# Patient Record
Sex: FEMALE | Race: WHITE | Employment: PART TIME | URBAN - METROPOLITAN AREA
[De-identification: names, ages, dates, MRNs, and addresses within clinical notes are randomized per-mention and may not be internally consistent; named-entity substitution may affect disease eponyms.]

---

## 2017-01-13 ENCOUNTER — HOSPITAL ENCOUNTER (OUTPATIENT)
Dept: PHYSICAL THERAPY | Age: 52
Discharge: HOME | End: 2017-01-13
Payer: COMMERCIAL

## 2017-01-13 PROCEDURE — 97530 THERAPEUTIC ACTIVITIES: CPT

## 2017-01-13 PROCEDURE — 97110 THERAPEUTIC EXERCISES: CPT

## 2017-01-13 PROCEDURE — 97140 MANUAL THERAPY 1/> REGIONS: CPT

## 2017-01-13 NOTE — PROGRESS NOTES
PHYSICAL THERAPY - DAILY TREATMENT NOTE    Patient Name: Lasha Vazquez        Date: 2017  : 1965    Patient  Verified: YES  Visit #:   16   of      Insurance: Payor: BLUE CROSS / Plan: [address] [place] [address] / Product Type: PPO /      In time: 930 Out time: 1030   Total Treatment Time: 60     Medicare Time Tracking (below)   Total Timed Codes (min):    1:1 Treatment Time:        TREATMENT AREA/ DIAGNOSIS = Low back pain [M54.5]  Fusion of spine, unspecified spinal region [M43.20]    SUBJECTIVE  Pain Level (on 0 to 10 scale):  2  / 10 left hip   Medication Changes/New allergies or changes in medical history, any new surgeries or procedures?     NO    If yes, update Summary List   Subjective Functional Status/Changes:  [x]  No changes reported     Functional improvement: MD said that I can start trying the needling   Functional limitation:       OBJECTIVE  Modalities Rationale:   x decrease edema/ inflammation  x decrease pain  x  increase tissue extensibility    x increase muscle performance    decrease neural compromise  to improve patient's ability to   x perform ADLs     ambulate  x perform work    relaxation/ sleep      min [] Estim, type/location:                                      []  att     []  unatt     []  w/US     []  w/ice    []  w/heat    min []  Mechanical Traction: type/lbs                   []  pro   []  sup   []  int   []  cont    []  before manual    []  after manual    min []  Ultrasound, settings/location:      min []  Iontophoresis w/ dexamethasone, location:                                               []  take home patch       []  in clinic    min   Ice        Heat    location/position:     min []  Vasopneumatic Device, press/temp:     min []  Other:    [] Skin assessment post-treatment (if applicable):    []  intact    []  redness- no adverse reaction     []redness  adverse reaction:        30 min Therapeutic Exercise:  [x]  See flow sheet   Rationale:   x increase ROM   x increase strength   x increase endurance     x increase motor control    Other  to improve patients ability to x perform ADLs     ambulate   x perform work    relaxation/ sleep     15 min Manual Therapy: Technique:        x STM    ASTM  x TPR    PROM    x Stretching     Jt manipulation  x Gr I.   x II.    x  III. IV.     V.  Treatment Area: l/s, QL, piriformis  Soft/Deep Tissue Massage, Manual Stretching, and Trigger Point Release to Cervical Spine Musculature, Upper Traps, and Levator Scapulae. Suboccipital Release. Other:   Rationale:   x decrease pain   x decrease TP   x increase ROM/ mobility   x increase tissue extensibility       decrease edema     reduce disc    postural correction   other     to improve patient's ability to  x perform ADLs      ambulate   x perform work     relaxation/ sleep      15  min Therapeutic Activity:  [x] see flow sheet   Rationale:  x  increase ROM    x  increase strength    x  increase balance/ proprioception  x    increase motor control    other   to improve patients ability to    x perform ADLs     ambulate   x  perform work                relaxation/ sleep      min Neuromuscular Re-ed:  [x] see flow sheet   Rationale:   x increase ROM    x increase strength    x increase balance/ proprioception  x increase motor control      improve safety   other    to improve patients ability to   x perform ADLs      ambulate   x perform work    relaxation/ sleep                min Gait Training: To improve patients ability to:    [] perform ADLs   [] ambulate       min Patient Education:  Yes    [x] Reviewed HEP   []  Progressed/Changed HEP based on:         Other Objective/Functional Measures:    Patient education for postural awareness, body mechanics, and lifting techniques      Post Treatment Pain Level (on 0 to 10) scale:   1  / 10     ASSESSMENT  []  See Progress Note/Recertification      Patient will continue to benefit from skilled PT services to modify and progress therapeutic interventions, address functional mobility deficits, address ROM deficits and address strength deficits to attain remaining goals.    Progress toward goals / Updated goals:    [x] decr pain   [x]inc stability   []inc balance [x] inc ROM[x] inc strength  [] centralizing symptoms                    [x] progressing  Function  [x] progressing towards LTGs            [x]  progressing HEP       PLAN  [x]  Upgrade activities as tolerated YES Continue plan of care   []  Discharge due to :    []  Other:       Therapist: Bessy Mleo PTA    Date: 1/13/2017 Time: 3:40 PM

## 2017-01-16 NOTE — PROGRESS NOTES
[address] PHYSICAL THERAPY AT [address], [address] [place] [address], [address], [address] [place] [address]  Phone: [phone]  Fax: [phone]  PROGRESS NOTE  Patient Name: Dominik Meyer : 1965   Treatment/Medical Diagnosis: Low back pain [M54.5]  Fusion of spine, unspecified spinal region [M43.20]   Referral Source: Damaris Oh MD     Date of Initial Visit: 10/14/16 Attended Visits: 17 Missed Visits: 3     SUMMARY OF TREATMENT  Progressive therapeutic exercise to increase strength, stability, endurance, and function. Manual Augmented soft tissue massage and trigger point release   Patient education for postural awareness, body mechanics, and lifting techniques     CURRENT STATUS  Patient is progressing slowly through core stability and strengthening. Will start myofascial trigger point dry needling (TDN) this week. Patient averaging 2/10 on NPS since she has started cutting down on the volume of exercises she was doing at the gym. Goal/Measure of Progress Goal Met? 1. Patient will decrease pain to 2/10 at worst to improve ability to perform ADLs    Status at last Eval: 3-4/10 Current Status: 5/10 no   2. Patient will increase BLE strength to 5/5 throughout to improve ability to return to a fitness routine    Status at last Eval: 4+/5, left hip abd/ext 4-/5 Current Status: Not assessed ongoing   3. Patient will improve FOTO score by 8 points    Status at last Eval: 59 Current Status: Not assessed ongoing      New Goals to be achieved in __4__ weeks:  1. Continue with above unmet goals   2. Pt will be independent and pain free with walk/jog program   3. Pt will be able to return to gym work outs without pain   4.        RECOMMENDATIONS  Continue per current POC to assist with increasing function while returning to previous ADLs. If you have any questions/comments please contact us directly at [phone].    Thank you for allowing us to assist in the care of your patient. Therapist Signature: Chetan Whitney PTA Date: 1/16/2017     Time: 1:03 PM   NOTE TO PHYSICIAN:  PLEASE COMPLETE THE ORDERS BELOW AND FAX TO   [facility] at [address]: [phone]. If you are unable to process this request in 24 hours please contact our office: [phone].    ___ I have read the above report and request that my patient continue as recommended.   ___ I have read the above report and request that my patient continue therapy with the following changes/special instructions:_________________________________________________________   ___ I have read the above report and request that my patient be discharged from therapy.      Physician Signature:        Date:       Time:

## 2017-01-20 ENCOUNTER — HOSPITAL ENCOUNTER (OUTPATIENT)
Dept: PHYSICAL THERAPY | Age: 52
Discharge: HOME | End: 2017-01-20
Payer: COMMERCIAL

## 2017-01-20 PROCEDURE — 97140 MANUAL THERAPY 1/> REGIONS: CPT

## 2017-01-20 PROCEDURE — 97110 THERAPEUTIC EXERCISES: CPT

## 2017-01-20 NOTE — PROGRESS NOTES
PHYSICAL THERAPY - DAILY TREATMENT NOTE  -    Patient Name: Caro Chi        Date: 2017  : 1965   YES Patient  Verified  Visit #:   (90) 7   of   8  Insurance: Payor: Kade May / Plan: [address] [place] [address] / Product Type: PPO /      In time: 9:30 Out time: 10:25   Total Treatment Time: 55     Medicare Time Tracking (below)   Total Timed Codes (min):   1:1 Treatment Time:       TREATMENT AREA = Low back pain [M54.5]  Fusion of spine, unspecified spinal region [M43.20]    SUBJECTIVE    Pain Level (on 0 to 10 scale):  2  / 10   Medication Changes/New allergies or changes in medical history, any new surgeries or procedures? NO    If yes, update Summary List   Subjective Functional Status/Changes:  []  No changes reported     \"still the same soreness, mostly in the L hip. \"       OBJECTIVE    25 min Therapeutic Exercise:  [x]  See flow sheet   Rationale:      increase ROM and increase strength to improve the patients ability to walk, work out       30 min Manual Therapy:  TDN L hip, infraspinatus   Rationale:      decrease pain, increase ROM, increase tissue extensibility and decrease trigger points to improve patient's ability to walk, work out       min Patient Education:  YES  Reviewed HEP   []  Progressed/Changed HEP based on: Other Objective/Functional Measures: Other Objective/Functional Measures:    Dry Needling Procedure Note    Dry Needle Session Number:  1    Procedure: An intramuscular manual therapy (dry needling) and a neuro-muscular re-education treatment was done to deactivate myofascial trigger points, with a 30/50 and 30/60 gauge solid filament needle, under aseptic technique.     Indication(s): [] Muscle spasms [] Myalgia/Myositis  [] Muscle cramps      [] Muscle imbalances [] TMD (TMJ) [x] Myofascial pain & dysfunction     [] Other: _muscle stiffness_    TIMEOUT PERFORMED:  9:30 (enter time the timeout was completed)   Clyde Ro (agustin who was present)    Informed Consent Obtained: [x] Verbal  [x] Written (obtained at first needling session)  The following items were reviewed with the patient:   Purpose of dry needling, side effects, possible complications, and the informed consent    The need to report the use of blood thinners and/or immunosuppressant medications    How to respond to possible adverse effects of the treatment   Self treatment of post needling soreness: heat (moist heat, heat wraps) and stretching   Opportunity was given to ask any questions, all questions were answered    Treatment:  The following muscles were treated today:    Right: n/a   Left: glute max, med, infraspinatus, piriformis     Patients response to todays treatment:   [x]  LTRs  [x]  Muscle Relaxation  [x]  Pain Relief  []  Decreased Tinnitus  []  Decreased HAs [x]  Post needling soreness [x]  Increased ROM   []  Other:             Post Treatment Pain Level (on 0 to 10) scale:   2  / 10     ASSESSMENT    Assessment/Changes in Function:     Improved L hip ROM with piriformis stretch following TDN. []  See Progress Note/Recertification   Patient will continue to benefit from skilled PT services to modify and progress therapeutic interventions, address functional mobility deficits, address ROM deficits, address strength deficits, analyze and address soft tissue restrictions, analyze and cue movement patterns, analyze and modify body mechanics/ergonomics, assess and modify postural abnormalities and instruct in home and community integration to attain remaining goals. to attain remaining goals.    Progress toward goals / Updated goals:    Progressing towards LTGs     PLAN    []  Upgrade activities as tolerated YES Continue plan of care   []  Discharge due to :    []  Other:      Therapist: Rodriguez Jenkins PT    Date: 1/20/2017 Time: 11:03 AM   Future Appointments  Date Time Provider Ramsey Trammell   1/25/2017 9:30 AM Hollie Leger PTA [facility]   2/1/2017 11:30 AM Elvin Ruiz PT [facility]

## 2017-01-25 ENCOUNTER — HOSPITAL ENCOUNTER (OUTPATIENT)
Dept: PHYSICAL THERAPY | Age: 52
Discharge: HOME | End: 2017-01-25
Payer: COMMERCIAL

## 2017-01-25 PROCEDURE — 97140 MANUAL THERAPY 1/> REGIONS: CPT

## 2017-01-25 PROCEDURE — 97110 THERAPEUTIC EXERCISES: CPT

## 2017-01-25 NOTE — PROGRESS NOTES
PHYSICAL THERAPY - DAILY TREATMENT NOTE    Patient Name: Jaquan Bradley        Date: 2017  : 1965    Patient  Verified: YES  Visit #:   23   of      Insurance: Payor: BLUE CROSS / Plan: [address] [place] [address] / Product Type: PPO /      In time: 930 Out time: 1020   Total Treatment Time: 50     Medicare Time Tracking (below)   Total Timed Codes (min):    1:1 Treatment Time:        TREATMENT AREA/ DIAGNOSIS = Low back pain [M54.5]  Fusion of spine, unspecified spinal region [M43.20]     SUBJECTIVE  Pain Level (on 0 to 10 scale):  2  / 10 left hip   Medication Changes/New allergies or changes in medical history, any new surgeries or procedures?     NO    If yes, update Summary List   Subjective Functional Status/Changes:  [x]  No changes reported     Functional improvement: I had better hip mobility    Functional limitation:  Still sore through my hip and arm      OBJECTIVE  Modalities Rationale:   x decrease edema/ inflammation  x decrease pain  x  increase tissue extensibility    x increase muscle performance    decrease neural compromise  to improve patient's ability to   x perform ADLs     ambulate  x perform work    relaxation/ sleep      min [] Estim, type/location:                                      []  att     []  unatt     []  w/US     []  w/ice    []  w/heat    min []  Mechanical Traction: type/lbs                   []  pro   []  sup   []  int   []  cont    []  before manual    []  after manual    min []  Ultrasound, settings/location:      min []  Iontophoresis w/ dexamethasone, location:                                               []  take home patch       []  in clinic    min   Ice        Heat    location/position:     min []  Vasopneumatic Device, press/temp:     min []  Other:    [] Skin assessment post-treatment (if applicable):    []  intact    []  redness- no adverse reaction     []redness  adverse reaction:        40 min Therapeutic Exercise:  [x]  See flow sheet Rationale:   x increase ROM   x increase strength   x increase endurance     x increase motor control    Other  to improve patients ability to x perform ADLs     ambulate   x perform work    relaxation/ sleep     10 min Manual Therapy: Technique:        x STM    ASTM  x TPR    PROM    x Stretching     Jt manipulation  x Gr I.   x II.    x  III. IV.     V.  Treatment Area: l/s, QL, piriformis  Soft/Deep Tissue Massage, Manual Stretching, and Trigger Point Release to Cervical Spine Musculature, Upper Traps, and Levator Scapulae. Suboccipital Release. Other:   Rationale:   x decrease pain   x decrease TP   x increase ROM/ mobility   x increase tissue extensibility       decrease edema     reduce disc    postural correction   other     to improve patient's ability to  x perform ADLs      ambulate   x perform work     relaxation/ sleep       min Therapeutic Activity:  [x] see flow sheet   Rationale:  x  increase ROM    x  increase strength    x  increase balance/ proprioception  x    increase motor control    other   to improve patients ability to    x perform ADLs     ambulate   x  perform work                relaxation/ sleep      min Neuromuscular Re-ed:  [x] see flow sheet   Rationale:   x increase ROM    x increase strength    x increase balance/ proprioception  x increase motor control      improve safety   other    to improve patients ability to   x perform ADLs      ambulate   x perform work    relaxation/ sleep                min Gait Training: To improve patients ability to:    [] perform ADLs   [] ambulate       min Patient Education:  Yes    [x] Reviewed HEP   []  Progressed/Changed HEP based on:         Other Objective/Functional Measures:    Patient education for postural awareness, body mechanics, and lifting techniques   Added TB press out, half kneel chops and lifts     Post Treatment Pain Level (on 0 to 10) scale:   1  / 10     ASSESSMENT  []  See Progress Note/Recertification Patient will continue to benefit from skilled PT services to modify and progress therapeutic interventions, address functional mobility deficits, address ROM deficits and address strength deficits to attain remaining goals.    Progress toward goals / Updated goals:    [x] decr pain   [x]inc stability   []inc balance [x] inc ROM[x] inc strength  [] centralizing symptoms                    [x] progressing  Function  [x] progressing towards LTGs            [x]  progressing HEP       PLAN  [x]  Upgrade activities as tolerated YES Continue plan of care   []  Discharge due to :    []  Other:       Therapist: Lizabeth Moreno PTA    Date: 1/25/2017 Time: 3:40 PM

## 2017-02-01 ENCOUNTER — HOSPITAL ENCOUNTER (OUTPATIENT)
Dept: PHYSICAL THERAPY | Age: 52
Discharge: HOME | End: 2017-02-01
Payer: COMMERCIAL

## 2017-02-01 PROCEDURE — 97110 THERAPEUTIC EXERCISES: CPT

## 2017-02-01 PROCEDURE — 97140 MANUAL THERAPY 1/> REGIONS: CPT

## 2017-02-01 NOTE — PROGRESS NOTES
PHYSICAL THERAPY - DAILY TREATMENT NOTE      Patient Name: Boubacar Mathur        Date: 2017  : 1965   YES Patient  Verified  Visit #:   (20) 1   of    8 Insurance: Payor: BLUE CROSS / Plan: [address] [place] [address] / Product Type: PPO /      In time: 11:30 Out time: 12:40   Total Treatment Time: 70     Medicare Time Tracking (below)   Total Timed Codes (min):   1:1 Treatment Time:       TREATMENT AREA = Low back pain [M54.5]  Fusion of spine, unspecified spinal region [M43.20]    SUBJECTIVE    Pain Level (on 0 to 10 scale):  2  / 10   Medication Changes/New allergies or changes in medical history, any new surgeries or procedures? NO    If yes, update Summary List   Subjective Functional Status/Changes:  []  No changes reported     \"I'm doing much better. Running 3-5 times/week but still feel tight and just a twinge in the upper back/shoulder. \"       OBJECTIVE    30 min Therapeutic Exercise:  [x]  See flow sheet   Rationale:      increase ROM, increase strength and improve coordination to improve the patients ability to run, work out       40 min Manual Therapy:  TDN B lumbar paraspinals, L glutes, piriformis, infraspinatus, upper trap   Rationale:      decrease pain, increase ROM, increase tissue extensibility and decrease trigger points to improve patient's ability to run, work out       min Patient Education:  YES  Reviewed HEP   []  Progressed/Changed HEP based on: Other Objective/Functional Measures: Other Objective/Functional Measures:    Dry Needling Procedure Note    Dry Needle Session Number:  2    Procedure: An intramuscular manual therapy (dry needling) and a neuro-muscular re-education treatment was done to deactivate myofascial trigger points, with a 30/50 gauge solid filament needle, under aseptic technique.     Indication(s): [] Muscle spasms [] Myalgia/Myositis  [] Muscle cramps      [] Muscle imbalances [] TMD (TMJ) [x] Myofascial pain & dysfunction     [] Other: _muscle stiffness_    TIMEOUT PERFORMED:  11:30 (enter time the timeout was completed)   Adina Recinos (enter who was present)    Informed Consent Obtained: [x] Verbal  [x] Written (obtained at first needling session)  The following items were reviewed with the patient:   Purpose of dry needling, side effects, possible complications, and the informed consent    The need to report the use of blood thinners and/or immunosuppressant medications    How to respond to possible adverse effects of the treatment   Self treatment of post needling soreness: heat (moist heat, heat wraps) and stretching   Opportunity was given to ask any questions, all questions were answered    Treatment:  The following muscles were treated today:    Right: Lumbar paraspinals   Left: Lumbar paraspinals, piriformis, glutes, infraspinatus, upper trap      Patients response to todays treatment:   [x]  LTRs  [x]  Muscle Relaxation  [x]  Pain Relief  []  Decreased Tinnitus  []  Decreased HAs [x]  Post needling soreness []  Increased ROM   []  Other:             Post Treatment Pain Level (on 0 to 10) scale:   0  / 10     ASSESSMENT    Assessment/Changes in Function:     Improved ROM and decreased pain following TDN. See PN     []  See Progress Note/Recertification   Patient will continue to benefit from skilled PT services to modify and progress therapeutic interventions, address functional mobility deficits, address ROM deficits, address strength deficits, analyze and address soft tissue restrictions, analyze and cue movement patterns, analyze and modify body mechanics/ergonomics, assess and modify postural abnormalities and instruct in home and community integration to attain remaining goals. to attain remaining goals.    Progress toward goals / Updated goals:    See PN     PLAN    []  Upgrade activities as tolerated YES Continue plan of care   []  Discharge due to :    []  Other:      Therapist: Ken Garcia, PT    Date: 2/1/2017 Time: 12:16 PM   No future appointments.

## 2017-02-01 NOTE — PROGRESS NOTES
[facility] PHYSICAL THERAPY AT [address], [address] [place] [address], [address], [address] [place] [address]  Phone: [phone]  Fax: [phone]  PROGRESS NOTE  Patient Name: Jd Anderson : 1965   Treatment/Medical Diagnosis: Low back pain [M54.5]  Fusion of spine, unspecified spinal region [M43.20]   Referral Source: Rod West MD     Date of Initial Visit: 10/14/16 Attended Visits: 20 Missed Visits: 1     SUMMARY OF TREATMENT  Physical therapy has consisted of therapeutic exercise and neuromuscular re-education for core and hip strengthening, manual therapy including DTM, TrPR, TDN, and STM, pt education for activity modification, posture correction, breathing techniques, and HEP, and ice for pain relief. CURRENT STATUS  Pt has made good progress in the past few weeks with the addition of TDN as part of treatment. She is able to walk/run for about 2 miles and is progressing well with gym workouts. She is also able to sleep much better. Pain has been minimal, but she continues to have stiffness in low back and L hip. Pt would benefit from continued PT to further reduce sx, restore full and pain free ROM, and improve strength to facilitate working out. Previous Goals:  1. Patient will decrease pain to 2/10 at worst to improve ability to perform ADLs   2. Patient will increase BLE strength to 5/5 throughout to improve ability to return to a fitness routine   3. Patient will improve FOTO score by 8 points  4. Pt will be independent and pain free with walk/jog program  5.  Pt will be able to return to gym work outs without pain    Prior Level/Current Level:  1) Prior Level: 5/10   Current Level: 4/10   Goal Met? progressing  2) Prior Level: 3-4/5 throughout   Current Level: Flex: 5/5 B, Ext: R: 4/5 L: 4+/5, ABD: 4+/5 B, ADD: 4/5 B, ER: R: 5/5 L: 4+/5, IR: R: 5/5 L: 4/5   Goal Met? progressing  3) Prior Level: 50   Current Level: 71   Goal Met? yes  4) Prior Level: unable   Current Level: independent in walk/jog program   Goal Met? yes  5) Prior Level: unable   Current Level: working out at the gym but lower intensity   Goal Met? progressing    New Goals to be achieved in __4__  weeks:  1. Patient will decrease pain to 2/10 at worst to improve ability to perform ADLs   2. Patient will increase BLE strength to 5/5 throughout to improve ability to return to a fitness routine  3. Pt will be able to return to gym work outs without pain   4. Pt will be independent in long term HEP    RECOMMENDATIONS  Continue PT 2x/week for 4 weeks  If you have any questions/comments please contact us directly at [phone]. Thank you for allowing us to assist in the care of your patient. Therapist Signature: Jennifer Porras PT, DPT Date: 2/1/2017     Time: 12:45 PM   NOTE TO PHYSICIAN:  PLEASE COMPLETE THE ORDERS BELOW AND FAX TO   [facility] at [address]: [phone]. If you are unable to process this request in 24 hours please contact our office: [phone].    ___ I have read the above report and request that my patient continue as recommended.   ___ I have read the above report and request that my patient continue therapy with the following changes/special instructions:_________________________________________________________   ___ I have read the above report and request that my patient be discharged from therapy.      Physician Signature:        Date:       Time:

## 2017-02-08 ENCOUNTER — APPOINTMENT (OUTPATIENT)
Dept: PHYSICAL THERAPY | Age: 52
End: 2017-02-08
Payer: COMMERCIAL

## 2017-02-10 ENCOUNTER — HOSPITAL ENCOUNTER (OUTPATIENT)
Dept: PHYSICAL THERAPY | Age: 52
Discharge: HOME | End: 2017-02-10
Payer: COMMERCIAL

## 2017-02-10 PROCEDURE — 97140 MANUAL THERAPY 1/> REGIONS: CPT

## 2017-02-10 PROCEDURE — 97110 THERAPEUTIC EXERCISES: CPT

## 2017-02-10 NOTE — PROGRESS NOTES
PHYSICAL THERAPY - DAILY TREATMENT NOTE    Patient Name: Allyn Hill        Date: 2/10/2017  : 1965    Patient  Verified: YES  Visit #:   21   of      Insurance: Payor: BLUE CROSS / Plan: [address] [place] [address] / Product Type: PPO /      In time: 10 Out time: 11   Total Treatment Time: 60     Medicare Time Tracking (below)   Total Timed Codes (min):    1:1 Treatment Time:        TREATMENT AREA/ DIAGNOSIS = Low back pain [M54.5]  Fusion of spine, unspecified spinal region [M43.20]     SUBJECTIVE  Pain Level (on 0 to 10 scale):  2  / 10 left hip   Medication Changes/New allergies or changes in medical history, any new surgeries or procedures?     NO    If yes, update Summary List   Subjective Functional Status/Changes:  [x]  No changes reported     Functional improvement: I had better hip mobility    Functional limitation:  Still sore through my hip and arm      OBJECTIVE  Modalities Rationale:   x decrease edema/ inflammation  x decrease pain  x  increase tissue extensibility    x increase muscle performance    decrease neural compromise  to improve patient's ability to   x perform ADLs     ambulate  x perform work    relaxation/ sleep      min [] Estim, type/location:                                      []  att     []  unatt     []  w/US     []  w/ice    []  w/heat    min []  Mechanical Traction: type/lbs                   []  pro   []  sup   []  int   []  cont    []  before manual    []  after manual    min []  Ultrasound, settings/location:      min []  Iontophoresis w/ dexamethasone, location:                                               []  take home patch       []  in clinic    min   Ice        Heat    location/position:     min []  Vasopneumatic Device, press/temp:     min []  Other:    [] Skin assessment post-treatment (if applicable):    []  intact    []  redness- no adverse reaction     []redness  adverse reaction:        40 min Therapeutic Exercise:  [x]  See flow sheet   Rationale: x increase ROM   x increase strength   x increase endurance     x increase motor control    Other  to improve patients ability to x perform ADLs     ambulate   x perform work    relaxation/ sleep     20 min Manual Therapy: Technique:        x STM    ASTM  x TPR    PROM    x Stretching     Jt manipulation  x Gr I.   x II.    x  III. IV.     V.  Treatment Area: l/s, QL, piriformis  Soft/Deep Tissue Massage, Manual Stretching, and Trigger Point Release to Cervical Spine Musculature, Upper Traps, and Levator Scapulae. Suboccipital Release. Other:   Rationale:   x decrease pain   x decrease TP   x increase ROM/ mobility   x increase tissue extensibility       decrease edema     reduce disc    postural correction   other     to improve patient's ability to  x perform ADLs      ambulate   x perform work     relaxation/ sleep       min Therapeutic Activity:  [x] see flow sheet   Rationale:  x  increase ROM    x  increase strength    x  increase balance/ proprioception  x    increase motor control    other   to improve patients ability to    x perform ADLs     ambulate   x  perform work                relaxation/ sleep      min Neuromuscular Re-ed:  [x] see flow sheet   Rationale:   x increase ROM    x increase strength    x increase balance/ proprioception  x increase motor control      improve safety   other    to improve patients ability to   x perform ADLs      ambulate   x perform work    relaxation/ sleep                min Gait Training: To improve patients ability to:    [] perform ADLs   [] ambulate       min Patient Education:  Yes    [x] Reviewed HEP   []  Progressed/Changed HEP based on:         Other Objective/Functional Measures:    Patient education for postural awareness, body mechanics, and lifting techniques   Added TB press out, half kneel chops and lifts     Post Treatment Pain Level (on 0 to 10) scale:   1  / 10     ASSESSMENT  []  See Progress Note/Recertification      Patient will continue to benefit from skilled PT services to modify and progress therapeutic interventions, address functional mobility deficits, address ROM deficits and address strength deficits to attain remaining goals.    Progress toward goals / Updated goals:    [x] decr pain   [x]inc stability   []inc balance [x] inc ROM[x] inc strength  [] centralizing symptoms                    [x] progressing  Function  [x] progressing towards LTGs            [x]  progressing HEP       PLAN  [x]  Upgrade activities as tolerated YES Continue plan of care   []  Discharge due to :    []  Other:       Therapist: Deana Ovalles PTA    Date: 2/10/2017 Time: 3:40 PM

## 2017-02-17 ENCOUNTER — HOSPITAL ENCOUNTER (OUTPATIENT)
Dept: PHYSICAL THERAPY | Age: 52
Discharge: HOME | End: 2017-02-17
Payer: COMMERCIAL

## 2017-02-17 PROCEDURE — 97140 MANUAL THERAPY 1/> REGIONS: CPT

## 2017-02-17 PROCEDURE — 97110 THERAPEUTIC EXERCISES: CPT

## 2017-02-17 NOTE — PROGRESS NOTES
PHYSICAL THERAPY - DAILY TREATMENT NOTE      Patient Name: Boubacar Mathur        Date: 2017  : 1965   YES Patient  Verified  Visit #:   22  2  of   8  Insurance: Payor: BLUE CROSS / Plan: [address] [place] [address] / Product Type: PPO /      In time: 11:35 Out time: 12:25   Total Treatment Time: 50     Medicare Time Tracking (below)   Total Timed Codes (min):   1:1 Treatment Time:       TREATMENT AREA = Low back pain [M54.5]  Fusion of spine, unspecified spinal region [M43.20]    SUBJECTIVE    Pain Level (on 0 to 10 scale):  2-3  / 10   Medication Changes/New allergies or changes in medical history, any new surgeries or procedures? NO    If yes, update Summary List   Subjective Functional Status/Changes:  []  No changes reported     \"The L hip is hurting more than the R but I've also been increasing activity. \"       OBJECTIVE    25 min Therapeutic Exercise:  [x]  See flow sheet   Rationale:      increase ROM, increase strength, improve coordination, improve balance and increase proprioception to improve the patients ability to work out       25 min Manual Therapy:  TDN L glutes, hip ERs   Rationale:      decrease pain, increase ROM, increase tissue extensibility and decrease trigger points to improve patient's ability to work out       min Patient Education:  YES  Reviewed HEP   []  Progressed/Changed HEP based on: Other Objective/Functional Measures: Other Objective/Functional Measures:    Dry Needling Procedure Note    Dry Needle Session Number:  3    Procedure: An intramuscular manual therapy (dry needling) and a neuro-muscular re-education treatment was done to deactivate myofascial trigger points, with a 30/50 and 30/75 gauge solid filament needle, under aseptic technique.     Indication(s): [] Muscle spasms [] Myalgia/Myositis  [] Muscle cramps      [] Muscle imbalances [] TMD (TMJ) [x] Myofascial pain & dysfunction     [] Other: _muscle stiffness_    TIMEOUT PERFORMED: 12:00 (enter time the timeout was completed)   Sylwia Anaya (enter who was present)    Informed Consent Obtained: [x] Verbal  [x] Written (obtained at first needling session)  The following items were reviewed with the patient:   Purpose of dry needling, side effects, possible complications, and the informed consent    The need to report the use of blood thinners and/or immunosuppressant medications    How to respond to possible adverse effects of the treatment   Self treatment of post needling soreness: heat (moist heat, heat wraps) and stretching   Opportunity was given to ask any questions, all questions were answered    Treatment:  The following muscles were treated today:    Right: n/a   Left: Glutes, hip ERs     Patients response to todays treatment:   [x]  LTRs  [x]  Muscle Relaxation  [x]  Pain Relief  []  Decreased Tinnitus  []  Decreased HAs [x]  Post needling soreness []  Increased ROM   []  Other:             Post Treatment Pain Level (on 0 to 10) scale:   0  / 10     ASSESSMENT    Assessment/Changes in Function:     Pt making good progress with return to previous activities. Chief c/o is stiffness in B hips L>R. []  See Progress Note/Recertification   Patient will continue to benefit from skilled PT services to modify and progress therapeutic interventions, address functional mobility deficits, address ROM deficits, address strength deficits, analyze and address soft tissue restrictions, analyze and cue movement patterns, analyze and modify body mechanics/ergonomics, assess and modify postural abnormalities and instruct in home and community integration to attain remaining goals. to attain remaining goals.    Progress toward goals / Updated goals:    Patient will decrease pain to 2/10 at worst to improve ability to perform ADLs -progressing     PLAN    []  Upgrade activities as tolerated YES Continue plan of care   []  Discharge due to :    []  Other:      Therapist: Magda Cobian, PT Date: 2/17/2017 Time: 11:38 AM   Future Appointments  Date Time Provider Ramsey Trammell   2/24/2017 9:30 AM Brandon Norris PTA [facility]   3/1/2017 11:30 AM Cr Plata PT [facility]

## 2017-02-24 ENCOUNTER — APPOINTMENT (OUTPATIENT)
Dept: PHYSICAL THERAPY | Age: 52
End: 2017-02-24
Payer: COMMERCIAL

## 2017-03-01 ENCOUNTER — APPOINTMENT (OUTPATIENT)
Dept: PHYSICAL THERAPY | Age: 52
End: 2017-03-01

## 2017-03-03 ENCOUNTER — APPOINTMENT (OUTPATIENT)
Dept: PHYSICAL THERAPY | Age: 52
End: 2017-03-03

## 2017-04-13 NOTE — PROGRESS NOTES
[facility]  PHYSICAL THERAPY AT [address], [address] [place] [address], [address], [address] [place] [address]  Phone: [phone]  Fax: [phone] SUMMARY  Patient Name: Sixto Bullock : 1965   Treatment/Medical Diagnosis: Low back pain [M54.5]  Fusion of spine, unspecified spinal region [M43.20]   Referral Source: Eagle García MD     Date of Initial Visit: 10/14/17 Attended Visits: 22 Missed Visits: 3     SUMMARY OF TREATMENT  Physical therapy has consisted of therapeutic exercise and neuromuscular re-education for core and hip strengthening, manual therapy including DTM, TrPR, TDN, and STM, pt education for activity modification, posture correction, breathing techniques, and HEP, and ice for pain relief. CURRENT STATUS  Unable to complete formal re-assessment, as pt did not return to PT after 22nd visit on 17. At last visit, she reported less pain overall and chief complaint was general stiffness. She had begun to increase activity level and had returned to running short distances. Previous Goals:  1. Patient will decrease pain to 2/10 at worst to improve ability to perform ADLs   2. Patient will increase BLE strength to 5/5 throughout to improve ability to return to a fitness routine  3. Pt will be able to return to gym work outs without pain   4.  Pt will be independent in long term HEP     Prior Level/Current Level:  1) Prior Level: 4/10   Current Level: unable to assess   Goal Met? n/a  2) Prior Level: Flex: 5/5 B, Ext: R: 4/5 L: 4+/5, ABD: 4+/5 B, ADD: 4/5 B, ER: R: 5/5 L: 4+/5, IR: R: 5/5 L: 4/5   Current Level: unable to assess   Goal Met? n/a  3) Prior Level: working out at [employer] but at a lower intensity   Current Level: working out at [employer] but at a lower intensity   Goal Met? progressing  4) Prior Level: n/a   Current Level: independent with current HEP   Goal Met? no    RECOMMENDATIONS  Discontinue therapy due to lack of attendance or compliance. If you have any questions/comments please contact us directly at [phone]. Thank you for allowing us to assist in the care of your patient.     Therapist Signature: Yousuf Whittaker PT, DPT Date: 4/13/17     Time: 7:43 AM